# Patient Record
Sex: MALE | URBAN - METROPOLITAN AREA
[De-identification: names, ages, dates, MRNs, and addresses within clinical notes are randomized per-mention and may not be internally consistent; named-entity substitution may affect disease eponyms.]

---

## 2021-08-10 ENCOUNTER — NURSE TRIAGE (OUTPATIENT)
Dept: OTHER | Facility: OTHER | Age: 56
End: 2021-08-10

## 2021-08-11 NOTE — TELEPHONE ENCOUNTER
Regardin/4 Travel Swab  ----- Message from [facility] sent at 8/10/2021  4:00 PM EDT -----  "we are traveling so i would like to get us all tested "

## 2021-08-11 NOTE — TELEPHONE ENCOUNTER
Reason for Disposition   Caller has already spoken with the PCP and has no further questions      Answer Assessment - Initial Assessment Questions  N/A  *No Answer*    Protocols used: NO CONTACT OR DUPLICATE CONTACT CALL-ADULT-AH